# Patient Record
Sex: MALE | Race: AMERICAN INDIAN OR ALASKA NATIVE
[De-identification: names, ages, dates, MRNs, and addresses within clinical notes are randomized per-mention and may not be internally consistent; named-entity substitution may affect disease eponyms.]

---

## 2018-04-16 ENCOUNTER — HOSPITAL ENCOUNTER (OUTPATIENT)
Dept: HOSPITAL 5 - CT | Age: 68
Discharge: HOME | End: 2018-04-16
Attending: INTERNAL MEDICINE
Payer: MEDICARE

## 2018-04-16 DIAGNOSIS — I77.810: ICD-10-CM

## 2018-04-16 DIAGNOSIS — I25.10: ICD-10-CM

## 2018-04-16 DIAGNOSIS — M47.894: ICD-10-CM

## 2018-04-16 DIAGNOSIS — I51.7: Primary | ICD-10-CM

## 2018-04-16 DIAGNOSIS — I70.0: ICD-10-CM

## 2018-04-16 LAB — BUN SERPL-MCNC: 25 MG/DL (ref 9–20)

## 2018-04-16 PROCEDURE — 84520 ASSAY OF UREA NITROGEN: CPT

## 2018-04-16 PROCEDURE — 71275 CT ANGIOGRAPHY CHEST: CPT

## 2018-04-16 PROCEDURE — 82565 ASSAY OF CREATININE: CPT

## 2018-04-16 PROCEDURE — 36415 COLL VENOUS BLD VENIPUNCTURE: CPT

## 2018-04-16 NOTE — CAT SCAN REPORT
FINAL REPORT



EXAM:  CT ANGIO CHEST



HISTORY:  DILATED AORTIC ROOT



TECHNIQUE:  CTA of chest with IV contrast. Coronal and sagittal

and MIP reconstructed images provided. 



PRIORS:  None currently available.



FINDINGS:  

No pneumothorax. No effusion. No consolidation. No endobronchial

lesion. 



Main pulmonary arteries are unremarkable. No pulmonary embolus. 



Mild ectasia at the aortic root. Moderate aortic atherosclerotic

disease. Major branch vessels are unremarkable. Aortic root

measures 3.5 cm. Ascending aorta measures 3.4 cm. Horizontal

aorta measures 2.3 cm. Focal protrusion at the descending aorta

measures 3.1 cm. No dissection. 



Mild-to-moderate cardiomegaly. No pericardial effusion. Coronary

artery disease. 



There is no axillary adenopathy. 



There is no hilar or mediastinal mass or adenopathy. 



Images of the esophagus are unremarkable. 



Images of the thyroid gland are unremarkable. 



No suspicious osseous lesions on this limited examination of the

skeleton. Metastatic disease better evaluated with bone scan. 



Degenerative changes are present in the spine. 



IMPRESSION:  

Minimal to mild ectasia of the aortic root. No aortic aneurysm or

dissection. 



Cardiomegaly.